# Patient Record
Sex: MALE | Race: WHITE | Employment: UNEMPLOYED | URBAN - METROPOLITAN AREA
[De-identification: names, ages, dates, MRNs, and addresses within clinical notes are randomized per-mention and may not be internally consistent; named-entity substitution may affect disease eponyms.]

---

## 2017-07-27 ENCOUNTER — THERAPY VISIT (OUTPATIENT)
Dept: PHYSICAL THERAPY | Facility: CLINIC | Age: 22
End: 2017-07-27
Payer: COMMERCIAL

## 2017-07-27 DIAGNOSIS — M25.562 LEFT KNEE PAIN: Primary | ICD-10-CM

## 2017-07-27 PROCEDURE — 97161 PT EVAL LOW COMPLEX 20 MIN: CPT | Mod: GP | Performed by: PHYSICAL THERAPIST

## 2017-07-27 PROCEDURE — 97110 THERAPEUTIC EXERCISES: CPT | Mod: GP | Performed by: PHYSICAL THERAPIST

## 2017-07-27 NOTE — PROGRESS NOTES
Physical Therapy Initial Examination/Evaluation  July 27, 2017    Art Sandhu is a 21 year old male referred to physical therapy by Dr Rao at [facility] for treatment of R knee post-op DOS 7-27-17 with Precautions/Restrictions/MD instructions:  No orders received but were requested the morning of evaluation.    Patient's father was present for the latter portion of evaluation with patient's permission.    Therapist Impression:   Art presents with quadriceps inhibition and poor strength, adequate ROM, not unexpected pain with exercise today (had had no medication for pain since yesterday morning).  Instructed both the patient and his father in appropriate exercise and activity, precautions, symptom management.  Will follow until he goes on vacation on 8-7-17, resume when he returns.    Subjective:  DOI/onset: Remote DOS: 7-27-17 (current), scope, removal of scar tissue.  Acute Injury or Gradual Onset?: Not discussed   Mechanism of Injury: N/A  Related PMH: 2 previous surgical procedures on this knee. Previous Treatment: PT after surgeries Effect of prior treatment: Improved  Imaging: None available (recent)  Chief Complaint/Functional Limitations:   Reports hasn't had much pain in past 24 hours, hasn't taken any pain medication since yesterday morning.  He has been resting with LE elevated and doing the exercises he was taught (ankle pumps, quad sets, SLR supine) and using crutches.  States that he was told he could weight bear as tolerated, has taken a few steps without crutches, and see below in therapy evaluation codes   Pain: None today on arrival /10, 5/10 after doing some exercise (SLR and ROM) Location: Anterior knee Frequency: N/A Described as: Painful Alleviated by: Rest, Ice and Elevation Progression of Symptoms: Gradually getting better. Time of day when pain is worse: NA  Sleeping: unsure   Occupation: student  Job duties: As expected, on summer break  Current HEP/exercise regimen: As noted  abover  Patient's goals are to return to normal activity    Other pertinent PMH/Red Flags: asthma   Barriers at home/work: Previous for OCD lesion (2011, 2013  Pertinent Surgical History: noted  Medications: None as reported by patient  General health as reported by patient: Did not respond on intake form  Return to MD:  Not noted by patient  Subjective:    HPI                    Objective:    Observation:  Patient arrives with crutches, large compression bandage on L knee which was not removed by therapist.  Family had been instructed to remove later in the day.  Patient was instructed to be sure to call MD with any concerns, reviewed these.  Patient was noted to take a couple of steps without crutches with very antalgic gait.  Clear explanation was given to patient regarding this, and not to go without crutches at this time due to pain, poor muscle performance, antalgic gait.  Did discuss possible use of 1 crutch if not painful and good gait.  Advised patient strongly to use crutch(es) on steps and not to do reciprocally at this time (stated that he had done a bit).  Clear explanation was also given to patient and father to do exercise/walking as tolerated, not to force motion.    System                                                Knee Evaluation:  ROM:  Arom wnl knee: R normal.    AROM    Hyperextension: Left:  0    Right:  Extension: Left: 30 (demonstrated to therapist)    Right:   Flexion: Left: 70   Right:  PROM    Hyperextension: Left: 0   Right:   Extension: Left: Appeared to be near neutral   Right:   Flexion: Left: 90 after self AAROM   Right:   Pain: Had pain following limited exercise  Endfeel: N/A                Quad set poor on arrival, improved with cues and practice, still fair, required verbal and visual cues, was able to perform properly.  SLR:  Patient demonstrated performance of HEP, knee at 30 degrees of flexion.  Required assist in supine to maintain extension to neutral  (father was taught  how to assist patient until independent with full extension), discussion with patient and father about this.  AAROM for knee flexion in sitting, shown, able to perform.  Instructed patient to go gently, not to force at all.    SLR sidelying (abduction) independent but shaky.  Ankle motion wnl  No warmth or edema distal or proximal to compression dressing.   General     ROS    Assessment/Plan:      Patient is a 21 year old male with left knee complaints.    Patient has the following significant findings with corresponding treatment plan.                Diagnosis 1:  Arthroscopy/ removal of scar tissue per patient/father    Pain -  hot/cold therapy, self management, education and home program  Decreased ROM/flexibility - therapeutic exercise and home program  Decreased strength - therapeutic exercise, therapeutic activities and home program  Edema - cryocuff, self management/home program and vasopneumatics may be used  Impaired gait - gait training, assistive devices and home program  Impaired muscle performance - biofeedback may be used, neuro re-education  Decreased function - therapeutic activities and home program    Therapy Evaluation Codes:   1) History comprised of:   Personal factors that impact the plan of care:      None,  maturity   Comorbidity factors that impact the plan of care are:      Asthma.     Medications impacting care: None being used, may be using pain medications temporarily.  2) Examination of Body Systems comprised of:   Body structures and functions that impact the plan of care:      Knee.   Activity limitations that impact the plan of care are:      Jumping, Lifting, Running, Squatting/kneeling, Stairs, Standing and Walking.  3) Clinical presentation characteristics are:   Stable/Uncomplicated.  4) Decision-Making    Low complexity using standardized patient assessment instrument and/or measureable assessment of functional outcome.  Cumulative Therapy Evaluation is: Low  complexity.    Previous and current functional limitations:  (See Goal Flow Sheet for this information)    Short term and Long term goals: (See Goal Flow Sheet for this information)     Communication ability:  Patient appears to be able to clearly communicate and understand verbal and written communication and follow directions correctly.    Treatment Explanation - The following has been discussed with the patient:   RX ordered/plan of care  Anticipated outcomes  Possible risks and side effects, precautions regarding overdoing  This patient would benefit from PT intervention to resume normal activities.   Rehab potential is good.    Frequency:  2 X week, once daily  Duration:  for 4 weeks  Discharge Plan:  Achieve all LTG.  Independent in home treatment program.  Reach maximal therapeutic benefit.    Please refer to the daily flowsheet for treatment today, total treatment time and time spent performing 1:1 timed codes.

## 2017-07-27 NOTE — MR AVS SNAPSHOT
After Visit Summary   7/27/2017    Art Sandhu    MRN: [id number]           Patient Information     Date Of Birth          1995        Visit Information        Provider Department      7/27/2017 11:30 AM Priya Santamaria, PT [facility] [place]        Today's Diagnoses     Left knee pain    -  1       Follow-ups after your visit        Your next 10 appointments already scheduled     Aug 01, 2017 10:50 AM CDT   JULITA Extremity with Asher Bond PT   [facility] [place] ([facility])    [address] [place] [id number] [phone]            Aug 03, 2017 10:20 AM CDT   JULITA Extremity with Priya Santamaria PT   [facility] [place] ([facility])    [address] [place] [id number] [phone]            Aug 17, 2017  5:20 PM CDT   JULITA Extremity with Asher Bond PT   [facility] [place] ( [facility])    [address] [place] [address] [id number] [phone]              Who to contact     If you have questions or need follow up information about today's clinic visit or your schedule please contact [facility] directly at [phone].  Normal or non-critical lab and imaging results will be communicated to you by MyChart, letter or phone within 4 business days after the clinic has received the results. If you do not hear from us within 7 days, please contact the clinic through MyChart or phone. If you have a critical or abnormal lab result, we will notify you by phone as soon as possible.  Submit refill requests through [employer] or call your pharmacy and they will forward the refill request to us. Please allow 3 business days for your refill to be completed.          Additional Information About Your Visit        [facility]  "Information     [employer] lets you send messages to your doctor, view your test results, renew your prescriptions, schedule appointments and more. To sign up, go to [email or web address] . Click on \"Log in\" on the left side of the screen, which will take you to the Welcome page. Then click on \"Sign up Now\" on the right side of the page.     You will be asked to enter the access code listed below, as well as some personal information. Please follow the directions to create your username and password.     Your access code is: [email or web address]  Expires: 10/26/2017 11:59 AM     Your access code will  in 90 days. If you need help or a new code, please call your [facility] clinic or [phone].        Care EveryWhere ID     This is your Care EveryWhere ID. This could be used by other organizations to access your [facility] medical records  [id number]         Blood Pressure from Last 3 Encounters:   10/18/13 123/67    Weight from Last 3 Encounters:   10/18/13 48.2 kg (106 lb 4.8 oz) (7 %)*     * Growth percentiles are based on CDC 2-20 Years data.              We Performed the Following     JULITA Inital Eval Report     PT Eval, Low Complexity (76558)     Therapeutic Exercises        Primary Care Provider Office Phone # Fax #    Octaviano Joyce [phone]       [facility] [address] [place] [address]        Equal Access to Services     [facility] Center[facility]AVIER AH: Hadii aad ku hadasho Soomaali, waaxda luqadaha, qaybta kaalmada adeegyada, waxay sylvia hurst . So [facility] [phone].    ATENCIÓN: Si habla español, tiene a hutchinson disposición servicios gratuitos de asistencia lingüística. Delonte al [phone].    We comply with applicable federal civil rights laws and [place] laws. We do not discriminate on the basis of race, color, national origin, age, disability sex, sexual orientation or gender identity.            Thank you!     Thank you for choosing [facility]  for your care. Our goal is always to provide you with excellent care. Hearing back from our patients is one way we can continue to improve our services. Please take a few minutes to complete the written survey that you may receive in the mail after your visit with us. Thank you!             Your Updated Medication List - Protect others around you: Learn how to safely use, store and throw away your medicines at www.disposemymeds.org.          This list is accurate as of: 7/27/17 11:59 PM.  Always use your most recent med list.                   Brand Name Dispense Instructions for use Diagnosis    ADDERALL 20 MG per tablet   Generic drug:  amphetamine-dextroamphetamine      Take 20 mg by mouth 2 times daily        oxyCODONE 5 MG IR tablet    ROXICODONE    30 tablet    Take 1-2 tablets (5-10 mg) by mouth every 3 hours as needed for other (Moderate to Severe Pain)    Retained orthopaedic hardware

## 2017-08-01 ENCOUNTER — THERAPY VISIT (OUTPATIENT)
Dept: PHYSICAL THERAPY | Facility: CLINIC | Age: 22
End: 2017-08-01
Payer: COMMERCIAL

## 2017-08-01 DIAGNOSIS — M25.569 KNEE PAIN: ICD-10-CM

## 2017-08-01 PROCEDURE — 97112 NEUROMUSCULAR REEDUCATION: CPT | Mod: GP | Performed by: PHYSICAL THERAPIST

## 2017-08-01 PROCEDURE — 97530 THERAPEUTIC ACTIVITIES: CPT | Mod: GP | Performed by: PHYSICAL THERAPIST

## 2017-08-01 PROCEDURE — 97110 THERAPEUTIC EXERCISES: CPT | Mod: GP | Performed by: PHYSICAL THERAPIST

## 2017-08-03 ENCOUNTER — THERAPY VISIT (OUTPATIENT)
Dept: PHYSICAL THERAPY | Facility: CLINIC | Age: 22
End: 2017-08-03
Payer: COMMERCIAL

## 2017-08-03 DIAGNOSIS — M25.569 KNEE PAIN: Primary | ICD-10-CM

## 2017-08-03 PROCEDURE — 97016 VASOPNEUMATIC DEVICE THERAPY: CPT | Mod: GP | Performed by: PHYSICAL THERAPIST

## 2017-08-03 PROCEDURE — 97530 THERAPEUTIC ACTIVITIES: CPT | Mod: GP | Performed by: PHYSICAL THERAPIST

## 2017-08-03 PROCEDURE — 97112 NEUROMUSCULAR REEDUCATION: CPT | Mod: GP | Performed by: PHYSICAL THERAPIST

## 2017-08-03 PROCEDURE — 97110 THERAPEUTIC EXERCISES: CPT | Mod: GP | Performed by: PHYSICAL THERAPIST

## 2017-08-17 ENCOUNTER — THERAPY VISIT (OUTPATIENT)
Dept: PHYSICAL THERAPY | Facility: CLINIC | Age: 22
End: 2017-08-17
Payer: COMMERCIAL

## 2017-08-17 DIAGNOSIS — M25.569 KNEE PAIN: ICD-10-CM

## 2017-08-17 PROCEDURE — 97110 THERAPEUTIC EXERCISES: CPT | Mod: GP | Performed by: PHYSICAL THERAPIST

## 2017-08-17 PROCEDURE — 97112 NEUROMUSCULAR REEDUCATION: CPT | Mod: GP | Performed by: PHYSICAL THERAPIST

## 2017-08-17 PROCEDURE — 97530 THERAPEUTIC ACTIVITIES: CPT | Mod: GP | Performed by: PHYSICAL THERAPIST

## 2021-05-30 ENCOUNTER — RECORDS - HEALTHEAST (OUTPATIENT)
Dept: ADMINISTRATIVE | Facility: CLINIC | Age: 26
End: 2021-05-30